# Patient Record
Sex: MALE | Race: WHITE | Employment: FULL TIME | URBAN - METROPOLITAN AREA
[De-identification: names, ages, dates, MRNs, and addresses within clinical notes are randomized per-mention and may not be internally consistent; named-entity substitution may affect disease eponyms.]

---

## 2018-07-01 ENCOUNTER — HOSPITAL ENCOUNTER (EMERGENCY)
Age: 28
Discharge: HOME | End: 2018-07-01
Attending: EMERGENCY MEDICINE | Admitting: EMERGENCY MEDICINE
Payer: COMMERCIAL

## 2018-07-01 ENCOUNTER — APPOINTMENT (OUTPATIENT)
Dept: GENERAL RADIOLOGY | Age: 28
End: 2018-07-01
Attending: PHYSICIAN ASSISTANT
Payer: COMMERCIAL

## 2018-07-01 VITALS
RESPIRATION RATE: 20 BRPM | SYSTOLIC BLOOD PRESSURE: 139 MMHG | DIASTOLIC BLOOD PRESSURE: 62 MMHG | OXYGEN SATURATION: 100 % | BODY MASS INDEX: 27.41 KG/M2 | HEART RATE: 99 BPM | HEIGHT: 73 IN | TEMPERATURE: 98.3 F | WEIGHT: 206.79 LBS

## 2018-07-01 DIAGNOSIS — T07.XXXA ABRASIONS OF MULTIPLE SITES: ICD-10-CM

## 2018-07-01 DIAGNOSIS — S30.0XXA CONTUSION OF SACRUM, INITIAL ENCOUNTER: Primary | ICD-10-CM
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
## 2018-07-01 DIAGNOSIS — S50.02XA CONTUSION OF LEFT ELBOW, INITIAL ENCOUNTER: ICD-10-CM

## 2018-07-01 DIAGNOSIS — Z23 NEED FOR TETANUS BOOSTER: ICD-10-CM

## 2018-07-01 PROCEDURE — 90715 TDAP VACCINE 7 YRS/> IM: CPT | Performed by: PHYSICIAN ASSISTANT

## 2018-07-01 PROCEDURE — 99283 EMERGENCY DEPT VISIT LOW MDM: CPT

## 2018-07-01 PROCEDURE — 74011250636 HC RX REV CODE- 250/636: Performed by: PHYSICIAN ASSISTANT

## 2018-07-01 PROCEDURE — 72220 X-RAY EXAM SACRUM TAILBONE: CPT

## 2018-07-01 PROCEDURE — 74011250637 HC RX REV CODE- 250/637: Performed by: PHYSICIAN ASSISTANT

## 2018-07-01 PROCEDURE — 90471 IMMUNIZATION ADMIN: CPT

## 2018-07-01 RX ORDER — CYCLOBENZAPRINE HCL 10 MG
10 TABLET ORAL
Qty: 20 TAB | Refills: 0 | Status: SHIPPED | OUTPATIENT
Start: 2018-07-01

## 2018-07-01 RX ORDER — NAPROXEN 250 MG/1
500 TABLET ORAL
Status: COMPLETED | OUTPATIENT
Start: 2018-07-01 | End: 2018-07-01

## 2018-07-01 RX ORDER — NAPROXEN 500 MG/1
500 TABLET ORAL
Qty: 20 TAB | Refills: 0 | Status: SHIPPED | OUTPATIENT
Start: 2018-07-01

## 2018-07-01 RX ORDER — PREDNISONE 20 MG/1
TABLET ORAL
COMMUNITY
Start: 2018-06-19 | End: 2018-07-01

## 2018-07-01 RX ORDER — BENZONATATE 100 MG/1
CAPSULE ORAL
COMMUNITY
Start: 2018-06-19 | End: 2018-07-01

## 2018-07-01 RX ADMIN — NAPROXEN 500 MG: 250 TABLET ORAL at 22:01

## 2018-07-01 RX ADMIN — TETANUS TOXOID, REDUCED DIPHTHERIA TOXOID AND ACELLULAR PERTUSSIS VACCINE, ADSORBED 0.5 ML: 5; 2.5; 8; 8; 2.5 SUSPENSION INTRAMUSCULAR at 22:01

## 2018-07-01 RX ADMIN — BACITRACIN ZINC, NEOMYCIN SULFATE, POLYMYXIN B SULFATE 1 PACKET: 3.5; 5000; 4 OINTMENT TOPICAL at 22:01

## 2018-07-02 NOTE — ED NOTES
Discharge instructions reviewed with patient. Discharge instructions given to patient per [facility]. Patient able to return/verbalize discharge instructions. Copy of discharge instructions given. Patient condition stable, respiratory status within normal limits, neuro status intact. Ambulatory out of ER.
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
## 2021-07-05 NOTE — LETTER
[address] 
[facility] EMERGENCY DEPT 
[address] [place] [address] [address] [id number] 
[phone] Work/School Note Date: 7/1/2018 To Whom It May concern: 
 
Javid House was seen and treated today in the emergency room by the following provider(s): 
Attending Provider: Taniya Park MD 
Physician Assistant: PALMER Cowart. Javid House may return to work on 7/4/18 or sooner, if feeling better. Sincerely, Kayli Ortega PA 
 
 
 
 no

## 2022-11-27 ENCOUNTER — HOSPITAL ENCOUNTER (EMERGENCY)
Age: 32
Discharge: HOME | End: 2022-11-28
Attending: STUDENT IN AN ORGANIZED HEALTH CARE EDUCATION/TRAINING PROGRAM
Payer: COMMERCIAL

## 2022-11-27 ENCOUNTER — APPOINTMENT (OUTPATIENT)
Dept: GENERAL RADIOLOGY | Age: 32
End: 2022-11-27
Attending: STUDENT IN AN ORGANIZED HEALTH CARE EDUCATION/TRAINING PROGRAM
Payer: COMMERCIAL

## 2022-11-27 VITALS
HEIGHT: 72 IN | BODY MASS INDEX: 27.09 KG/M2 | SYSTOLIC BLOOD PRESSURE: 150 MMHG | WEIGHT: 200 LBS | TEMPERATURE: 98.3 F | RESPIRATION RATE: 18 BRPM | HEART RATE: 98 BPM | OXYGEN SATURATION: 97 % | DIASTOLIC BLOOD PRESSURE: 111 MMHG

## 2022-11-27 DIAGNOSIS — S93.402A SPRAIN OF LEFT ANKLE, UNSPECIFIED LIGAMENT, INITIAL ENCOUNTER: Primary | ICD-10-CM

## 2022-11-27 DIAGNOSIS — S99.912A INJURY OF LEFT ANKLE, INITIAL ENCOUNTER: ICD-10-CM

## 2022-11-27 PROCEDURE — 73610 X-RAY EXAM OF ANKLE: CPT

## 2022-11-27 PROCEDURE — 99283 EMERGENCY DEPT VISIT LOW MDM: CPT

## 2022-11-27 RX ORDER — NAPROXEN 250 MG/1
250 TABLET ORAL 2 TIMES DAILY WITH MEALS
Qty: 14 TABLET | Refills: 0 | Status: SHIPPED | OUTPATIENT
Start: 2022-11-27 | End: 2022-12-04

## 2022-11-27 NOTE — Clinical Note
[address]  [facility] EMERGENCY DEPT  [address] [place] [address]  John Lebron [id number] [phone]    Work/School Note    Date: 11/27/2022    To Whom It May concern:    Madiha Moffett was seen and treated today in the emergency room by the following provider(s):  Attending Provider: Tisha Joel MD  Physician Assistant: Nellie Coffman. Madiha Moffett is excused from work/school on 11/27/2022 through 11/29/2022. He is medically clear to return to work/school on 11/30/2022.          Sincerely,          Jennifer Schneider, Nellie Plascencia

## 2022-11-28 NOTE — ED PROVIDER NOTES
EMERGENCY DEPARTMENT HISTORY AND PHYSICAL EXAM      Date: 11/27/2022  Patient Name: Richelle Avilez    History of Presenting Illness     Chief Complaint   Patient presents with    Ankle Injury     Sudden Left ankle pain and swelling while exercising. On crutches. History Provided By: Patient    HPI: Richelle Avilez, 33 y.o. male without reported PMHx presents BIB self to the ED with cc of left ankle injury that occurred this morning. The patient was wrestling and felt a pull at the anterior left ankle when getting up from the floor. He reports feeling 3 pops. He now complains of severe pain on weightbearing. Pain is felt mostly medially, but radiates laterally as well. He does not have an orthopedist.  He is using crutches from home. There are no other complaints, changes, or physical findings at this time. PCP: Janeth Clements MD    No current facility-administered medications on file prior to encounter. Current Outpatient Medications on File Prior to Encounter   Medication Sig Dispense Refill    naproxen (NAPROSYN) 500 mg tablet Take 1 Tab by mouth every twelve (12) hours as needed for Pain. 20 Tab 0    cyclobenzaprine (FLEXERIL) 10 mg tablet Take 1 Tab by mouth three (3) times daily (with meals). Indications: Muscle Spasm 20 Tab 0       Past History     Past Medical History:  No past medical history on file. Past Surgical History:  No past surgical history on file. Family History:  No family history on file. Social History:  Social History     Tobacco Use    Smoking status: Every Day     Packs/day: 1.00     Types: Cigarettes    Smokeless tobacco: Never   Substance Use Topics    Alcohol use: Yes     Comment: 2-6 beers a day    Drug use: Yes     Types: Cocaine     Comment: once in a while       Allergies: Allergies   Allergen Reactions    Penicillins Rash         Review of Systems   Review of Systems   Constitutional:  Negative for diaphoresis.    Musculoskeletal: Positive for arthralgias. Hematological:  Does not bruise/bleed easily. Physical Exam   Physical Exam  Vitals and nursing note reviewed. Constitutional:       General: He is not in acute distress. Appearance: Normal appearance. HENT:      Head: Normocephalic and atraumatic. Eyes:      Extraocular Movements: Extraocular movements intact. Conjunctiva/sclera: Conjunctivae normal.   Neck:      Trachea: Phonation normal.   Cardiovascular:      Comments: 2+ L DP pulse   Pulmonary:      Effort: Pulmonary effort is normal.   Musculoskeletal:      Cervical back: Normal range of motion and neck supple. Left ankle: No swelling or deformity. Tenderness (anteromedial aspect) present. No lateral malleolus, medial malleolus, base of 5th metatarsal or proximal fibula tenderness. Left Achilles Tendon: Normal. No tenderness or defects. Skin:     General: Skin is warm and dry. Capillary Refill: Capillary refill takes less than 2 seconds. Neurological:      General: No focal deficit present. Mental Status: He is alert and oriented to person, place, and time. GCS: GCS eye subscore is 4. GCS verbal subscore is 5. GCS motor subscore is 6. Psychiatric:         Mood and Affect: Mood normal.         Behavior: Behavior normal.       Diagnostic Study Results     Labs -   No results found for this or any previous visit (from the past 12 hour(s)). Radiologic Studies -   XR ANKLE LT MIN 3 V   Final Result   No acute abnormality. CT Results  (Last 48 hours)      None          CXR Results  (Last 48 hours)      None              Medical Decision Making   I am the first provider for this patient. I reviewed the vital signs, available nursing notes, past medical history, past surgical history, family history and social history. Vital Signs-Reviewed the patient's vital signs.   Patient Vitals for the past 12 hrs:   Temp Pulse Resp BP SpO2   11/27/22 2143 -- 98 -- -- --   11/27/22 1958 98.3 °F (36.8 °C) (!) 128 18 (!) 150/111 97 %       Records Reviewed: Nursing Notes    Provider Notes (Medical Decision Making):   No acute fracture seen on x-ray. Offered walking boot, however the patient declines. He prefers to continue crutches. We will also apply an Ace wrap for compression and support. Advised on RICE treatment, NSAIDs. Follow-up with Ortho. ED return precautions reviewed. ED Course:   Initial assessment performed. The patients presenting problems have been discussed, and they are in agreement with the care plan formulated and outlined with them. I have encouraged them to ask questions as they arise throughout their visit. Critical Care Time: None    Disposition:  dc    PLAN:  1. Current Discharge Medication List        START taking these medications    Details   !! naproxen (NAPROSYN) 250 mg tablet Take 1 Tablet by mouth two (2) times daily (with meals) for 7 days. Qty: 14 Tablet, Refills: 0  Start date: 11/27/2022, End date: 12/4/2022       !! - Potential duplicate medications found. Please discuss with provider. CONTINUE these medications which have NOT CHANGED    Details   !! naproxen (NAPROSYN) 500 mg tablet Take 1 Tab by mouth every twelve (12) hours as needed for Pain. Qty: 20 Tab, Refills: 0       !! - Potential duplicate medications found. Please discuss with provider. 2.   Follow-up Information       Follow up With Specialties Details Why Contact Info    [facility] EMERGENCY DEPT Emergency Medicine  As needed, If symptoms worsen [address]  [address]  [phone]    [facility]  Call  For follow up [address] [facility]  [address] [facility],[address]  [address]  [phone]          Return to ED if worse     Diagnosis     Clinical Impression:   1. Sprain of left ankle, unspecified ligament, initial encounter    2.  Injury of left ankle, initial encounter        PALMER Price (Electronic Signature)          Please note that this dictation was completed with [employer], the computer voice recognition software. Quite often unanticipated grammatical, syntax, homophones, and other interpretive errors are inadvertently transcribed by the computer software. Please disregards these errors. Please excuse any errors that have escaped final proofreading.

## 2023-12-16 ENCOUNTER — HOSPITAL ENCOUNTER (EMERGENCY)
Facility: HOSPITAL | Age: 33
Discharge: HOME | End: 2023-12-16
Attending: EMERGENCY MEDICINE
Payer: COMMERCIAL

## 2023-12-16 ENCOUNTER — APPOINTMENT (OUTPATIENT)
Facility: HOSPITAL | Age: 33
End: 2023-12-16
Payer: COMMERCIAL

## 2023-12-16 VITALS
WEIGHT: 220.68 LBS | DIASTOLIC BLOOD PRESSURE: 104 MMHG | SYSTOLIC BLOOD PRESSURE: 151 MMHG | BODY MASS INDEX: 29.93 KG/M2 | RESPIRATION RATE: 20 BRPM | TEMPERATURE: 98 F | HEART RATE: 102 BPM | OXYGEN SATURATION: 97 %

## 2023-12-16 DIAGNOSIS — S43.005A DISLOCATION OF LEFT SHOULDER JOINT, INITIAL ENCOUNTER: Primary | ICD-10-CM

## 2023-12-16 DIAGNOSIS — M21.822 HILL SACHS DEFORMITY, LEFT: ICD-10-CM

## 2023-12-16 PROCEDURE — 6360000002 HC RX W HCPCS: Performed by: EMERGENCY MEDICINE

## 2023-12-16 PROCEDURE — 73030 X-RAY EXAM OF SHOULDER: CPT

## 2023-12-16 PROCEDURE — 2500000003 HC RX 250 WO HCPCS

## 2023-12-16 RX ORDER — PROPOFOL 10 MG/ML
INJECTION, EMULSION INTRAVENOUS
Status: DISCONTINUED
Start: 2023-12-16 | End: 2023-12-16 | Stop reason: HOSPADM

## 2023-12-16 RX ORDER — KETAMINE HCL IN NACL, ISO-OSM 100MG/10ML
1 SYRINGE (ML) INJECTION
Status: DISCONTINUED | OUTPATIENT
Start: 2023-12-16 | End: 2023-12-16 | Stop reason: HOSPADM

## 2023-12-16 RX ORDER — ONDANSETRON 2 MG/ML
4 INJECTION INTRAMUSCULAR; INTRAVENOUS ONCE
Status: COMPLETED | OUTPATIENT
Start: 2023-12-16 | End: 2023-12-16

## 2023-12-16 RX ORDER — KETAMINE HYDROCHLORIDE 50 MG/ML
INJECTION, SOLUTION, CONCENTRATE INTRAMUSCULAR; INTRAVENOUS
Status: COMPLETED
Start: 2023-12-16 | End: 2023-12-16

## 2023-12-16 RX ORDER — KETAMINE HYDROCHLORIDE 50 MG/ML
30 INJECTION, SOLUTION, CONCENTRATE INTRAMUSCULAR; INTRAVENOUS ONCE
Status: COMPLETED | OUTPATIENT
Start: 2023-12-16 | End: 2023-12-16

## 2023-12-16 RX ORDER — OXYCODONE HYDROCHLORIDE AND ACETAMINOPHEN 5; 325 MG/1; MG/1
1 TABLET ORAL EVERY 6 HOURS PRN
Qty: 12 TABLET | Refills: 0 | Status: SHIPPED | OUTPATIENT
Start: 2023-12-16 | End: 2023-12-19

## 2023-12-16 RX ORDER — FENTANYL CITRATE 50 UG/ML
100 INJECTION, SOLUTION INTRAMUSCULAR; INTRAVENOUS
Status: COMPLETED | OUTPATIENT
Start: 2023-12-16 | End: 2023-12-16

## 2023-12-16 RX ADMIN — KETAMINE HYDROCHLORIDE 30 MG: 50 INJECTION, SOLUTION, CONCENTRATE INTRAMUSCULAR; INTRAVENOUS at 16:30

## 2023-12-16 RX ADMIN — PROPOFOL 400 MG: 10 INJECTION, EMULSION INTRAVENOUS at 16:27

## 2023-12-16 RX ADMIN — FENTANYL CITRATE 100 MCG: 50 INJECTION INTRAMUSCULAR; INTRAVENOUS at 15:07

## 2023-12-16 RX ADMIN — ONDANSETRON 4 MG: 2 INJECTION INTRAMUSCULAR; INTRAVENOUS at 15:06

## 2023-12-16 RX ADMIN — PROPOFOL 100 MG: 10 INJECTION, EMULSION INTRAVENOUS at 16:23

## 2023-12-16 RX ADMIN — KETAMINE HYDROCHLORIDE 30 MG: 50 INJECTION, SOLUTION INTRAMUSCULAR; INTRAVENOUS at 16:30

## 2023-12-16 ASSESSMENT — PAIN SCALES - GENERAL
PAINLEVEL_OUTOF10: 10
PAINLEVEL_OUTOF10: 10

## 2023-12-16 ASSESSMENT — PAIN DESCRIPTION - ORIENTATION: ORIENTATION: RIGHT

## 2023-12-16 ASSESSMENT — PAIN DESCRIPTION - LOCATION: LOCATION: ARM

## 2023-12-16 NOTE — ED NOTES
I have reviewed the provider's instructions with the patient, answering all questions to his satisfaction. Pt ambulatory out of ED.         Melissa Center [place], [place]  21/62/67 5616

## 2023-12-16 NOTE — ED NOTES
1724 Conscious sedation ended. Pt A&O x4. Pt resting quietly. Pt passed swallow screen.      Harlan Lozano  10/33[birth year]49 5824

## 2023-12-16 NOTE — DISCHARGE INSTRUCTIONS
No overhand activity until follow-up with Ortho    You should use the sling except when taking a shower. You can also use ibuprofen 6 or milligrams 3 times a day for pain. All narcotic pain medications have the potential to cause constipation. Would recommend daily MiraLAX use while using pain medications increase frequency as needed to keep stools soft and moving.

## 2023-12-16 NOTE — ED NOTES
1627 conscious sedation started by Jaison Phan MD. Jaison Phan administered 60mg of ketamine and 500mg of propofol. Pt is A&Ox4. Vitals are stable. Pt is resting quietly. Arm sling applied to L arm. This nurse is at bedside monitoring pt one on one.        Marilynn Hoskins, [place]  74/02/75 9523

## 2023-12-17 NOTE — ED PROVIDER NOTES
fractures. XR SHOULDER LEFT (MIN 2 VIEWS)   Final Result   Anterior glenohumeral dislocation. PROCEDURES   Unless otherwise noted below, none  Procedures  Procedure Note - Procedural Sedation:     Indication:  Procedural sedation is required to perform the following procedure:  Closed reduction, left shoulder dislocation    Informed Consent:  The reason for the procedure as well as the risks, benefits, and alternatives to the procedure have been explained to the  patient  and He consents to the procedure. The consent for sedation has been signed by the patient/representative, the medical provider and witnessed by the patient's nurse. Anesthesia Risks: The ASA score is ASA 1 - Normal, healthy patient    Mallampati Score Reference: The patient has a patent airway with a Mallampati Classification Score of I (soft palate, uvula, fauces, tonsillar pillars visible). Sedation was performed by:   Jalyn Cunningham DO  Procedure was performed by:  Dr. Radu Rubin, PGY 2, supervised by me    Pre-Procedure Sedation Assessment:  Sedation Start Time: 4:20 PM    Time Out was performed to confirm patient identity, laterality, indication, and contraindications, prior to procedural sedation. Post-Procedure Sedation Assessment:   Sedation End Time: 5:25PM   The patient was sedated with a total of 500mg propofol/DIPRIVAN and 60mg  Ketamine  via IV. Reversal agents and resuscitation equipment were at the bedside. Reversal agents were not required. The indicated procedure was completed and the patient tolerated the sedation well with no complications. Please refer to sedation flowsheet for nursing notes, vital signs, and specific timeline details. Jalyn Cunningham DO  8:08 PM     Procedure Note - Reduction:    8:26 PM EST  Performed by Radu Rubin MD, PGY-2 supervised  by Jalyn Cunningham DO       Procedure start time: 4:20PM  Procedure end time: 5:25PM  Procedure was performed with a block.

## 2023-12-17 NOTE — ED NOTES
Ketamine overridden from [address] per verbal order by Dr. Bianca Hair and provided to [facility].      Carlos Bowers RN  12/17/23 5877